# Patient Record
Sex: MALE | Race: BLACK OR AFRICAN AMERICAN | NOT HISPANIC OR LATINO
[De-identification: names, ages, dates, MRNs, and addresses within clinical notes are randomized per-mention and may not be internally consistent; named-entity substitution may affect disease eponyms.]

---

## 2017-02-08 ENCOUNTER — RESULT REVIEW (OUTPATIENT)
Age: 67
End: 2017-02-08

## 2017-02-16 LAB — INR PPP: 2.3

## 2017-03-31 LAB — INR PPP: 2.3

## 2017-05-21 ENCOUNTER — RESULT REVIEW (OUTPATIENT)
Age: 67
End: 2017-05-21

## 2017-05-22 ENCOUNTER — RESULT REVIEW (OUTPATIENT)
Age: 67
End: 2017-05-22

## 2017-05-22 LAB
INR PPP: 2
INR PPP: 2.3

## 2017-06-26 ENCOUNTER — RESULT REVIEW (OUTPATIENT)
Age: 67
End: 2017-06-26

## 2017-06-26 LAB — INR PPP: 2.8

## 2017-07-10 ENCOUNTER — RESULT REVIEW (OUTPATIENT)
Age: 67
End: 2017-07-10

## 2017-07-10 LAB — INR PPP: 2.2

## 2017-07-19 ENCOUNTER — RESULT REVIEW (OUTPATIENT)
Age: 67
End: 2017-07-19

## 2017-07-19 LAB — INR PPP: 2.3

## 2017-07-31 ENCOUNTER — RESULT REVIEW (OUTPATIENT)
Age: 67
End: 2017-07-31

## 2017-07-31 LAB — INR PPP: 3.2

## 2017-08-17 ENCOUNTER — RESULT REVIEW (OUTPATIENT)
Age: 67
End: 2017-08-17

## 2017-08-17 LAB — INR PPP: 3.1

## 2017-08-23 ENCOUNTER — NON-APPOINTMENT (OUTPATIENT)
Age: 67
End: 2017-08-23

## 2017-08-23 ENCOUNTER — RESULT REVIEW (OUTPATIENT)
Age: 67
End: 2017-08-23

## 2017-08-23 ENCOUNTER — APPOINTMENT (OUTPATIENT)
Dept: CARDIOLOGY | Facility: CLINIC | Age: 67
End: 2017-08-23
Payer: MEDICARE

## 2017-08-23 VITALS
BODY MASS INDEX: 23.54 KG/M2 | DIASTOLIC BLOOD PRESSURE: 80 MMHG | HEART RATE: 73 BPM | SYSTOLIC BLOOD PRESSURE: 110 MMHG | OXYGEN SATURATION: 98 % | WEIGHT: 150 LBS | HEIGHT: 67 IN

## 2017-08-23 DIAGNOSIS — R06.09 OTHER FORMS OF DYSPNEA: ICD-10-CM

## 2017-08-23 DIAGNOSIS — R73.03 PREDIABETES.: ICD-10-CM

## 2017-08-23 DIAGNOSIS — R07.89 OTHER CHEST PAIN: ICD-10-CM

## 2017-08-23 LAB — INR PPP: 2.2

## 2017-08-23 PROCEDURE — 99215 OFFICE O/P EST HI 40 MIN: CPT

## 2017-08-23 PROCEDURE — 93000 ELECTROCARDIOGRAM COMPLETE: CPT

## 2017-08-23 RX ORDER — AMOXICILLIN 500 MG/1
500 CAPSULE ORAL
Qty: 12 | Refills: 0 | Status: DISCONTINUED | COMMUNITY
Start: 2017-04-19

## 2017-08-23 RX ORDER — NITROFURANTOIN (MONOHYDRATE/MACROCRYSTALS) 25; 75 MG/1; MG/1
100 CAPSULE ORAL
Qty: 10 | Refills: 0 | Status: DISCONTINUED | COMMUNITY
Start: 2017-08-09

## 2017-08-23 RX ORDER — CEPHALEXIN 500 MG/1
500 CAPSULE ORAL
Qty: 10 | Refills: 0 | Status: DISCONTINUED | COMMUNITY
Start: 2017-07-07

## 2017-08-23 RX ORDER — AMOXICILLIN AND CLAVULANATE POTASSIUM 875; 125 MG/1; MG/1
875-125 TABLET, COATED ORAL
Qty: 14 | Refills: 0 | Status: DISCONTINUED | COMMUNITY
Start: 2017-06-30

## 2017-08-28 ENCOUNTER — APPOINTMENT (OUTPATIENT)
Dept: CARDIOLOGY | Facility: CLINIC | Age: 67
End: 2017-08-28
Payer: MEDICARE

## 2017-08-28 PROCEDURE — 93306 TTE W/DOPPLER COMPLETE: CPT

## 2017-09-20 ENCOUNTER — RX RENEWAL (OUTPATIENT)
Age: 67
End: 2017-09-20

## 2017-09-20 LAB — INR PPP: 2.3

## 2017-10-04 ENCOUNTER — RESULT REVIEW (OUTPATIENT)
Age: 67
End: 2017-10-04

## 2017-10-04 LAB — INR PPP: 2.2

## 2017-10-20 ENCOUNTER — RESULT REVIEW (OUTPATIENT)
Age: 67
End: 2017-10-20

## 2017-10-20 LAB — INR PPP: 2.5

## 2017-11-15 ENCOUNTER — RESULT REVIEW (OUTPATIENT)
Age: 67
End: 2017-11-15

## 2017-11-15 LAB — INR PPP: 2.6

## 2017-12-13 ENCOUNTER — RESULT REVIEW (OUTPATIENT)
Age: 67
End: 2017-12-13

## 2017-12-13 LAB — INR PPP: 2.4

## 2017-12-20 ENCOUNTER — RESULT REVIEW (OUTPATIENT)
Age: 67
End: 2017-12-20

## 2017-12-20 LAB — INR PPP: 2.3

## 2018-02-21 ENCOUNTER — RESULT REVIEW (OUTPATIENT)
Age: 68
End: 2018-02-21

## 2018-02-21 LAB
INR PPP: 2.7
INR PPP: 2.8

## 2018-03-05 ENCOUNTER — RESULT REVIEW (OUTPATIENT)
Age: 68
End: 2018-03-05

## 2018-03-05 LAB
INR PPP: 1.1
INR PPP: 1.1
INR PPP: 1.8
PROTHROMBIN TIME COMMENT: NORMAL

## 2018-03-12 ENCOUNTER — RESULT REVIEW (OUTPATIENT)
Age: 68
End: 2018-03-12

## 2018-03-12 LAB — INR PPP: 1.9

## 2018-04-02 ENCOUNTER — MEDICATION RENEWAL (OUTPATIENT)
Age: 68
End: 2018-04-02

## 2018-04-02 DIAGNOSIS — N52.9 MALE ERECTILE DYSFUNCTION, UNSPECIFIED: ICD-10-CM

## 2018-04-03 ENCOUNTER — OTHER (OUTPATIENT)
Age: 68
End: 2018-04-03

## 2018-04-03 LAB — INR PPP: 2.4

## 2018-05-21 ENCOUNTER — RESULT REVIEW (OUTPATIENT)
Age: 68
End: 2018-05-21

## 2018-05-21 LAB — INR PPP: 1.9

## 2018-05-29 ENCOUNTER — RESULT REVIEW (OUTPATIENT)
Age: 68
End: 2018-05-29

## 2018-05-29 LAB — INR PPP: 2.2

## 2018-06-26 ENCOUNTER — RESULT REVIEW (OUTPATIENT)
Age: 68
End: 2018-06-26

## 2018-06-26 LAB — INR PPP: 3.3

## 2018-08-29 ENCOUNTER — RESULT REVIEW (OUTPATIENT)
Age: 68
End: 2018-08-29

## 2018-08-29 LAB
INR PPP: 1.7
INR PPP: 1.7
INR PPP: 2.4
INR PPP: 3

## 2018-09-18 ENCOUNTER — RESULT REVIEW (OUTPATIENT)
Age: 68
End: 2018-09-18

## 2018-09-18 LAB — INR PPP: 2.7

## 2018-09-26 ENCOUNTER — APPOINTMENT (OUTPATIENT)
Dept: CARDIOLOGY | Facility: CLINIC | Age: 68
End: 2018-09-26
Payer: MEDICARE

## 2018-09-26 ENCOUNTER — NON-APPOINTMENT (OUTPATIENT)
Age: 68
End: 2018-09-26

## 2018-09-26 VITALS
SYSTOLIC BLOOD PRESSURE: 112 MMHG | DIASTOLIC BLOOD PRESSURE: 60 MMHG | OXYGEN SATURATION: 98 % | BODY MASS INDEX: 24.01 KG/M2 | HEART RATE: 67 BPM | WEIGHT: 153 LBS | HEIGHT: 67 IN

## 2018-09-26 DIAGNOSIS — Z51.81 ENCOUNTER FOR THERAPEUTIC DRUG LVL MONITORING: ICD-10-CM

## 2018-09-26 DIAGNOSIS — Z79.01 ENCOUNTER FOR THERAPEUTIC DRUG LVL MONITORING: ICD-10-CM

## 2018-09-26 PROCEDURE — 93000 ELECTROCARDIOGRAM COMPLETE: CPT

## 2018-09-26 PROCEDURE — 99214 OFFICE O/P EST MOD 30 MIN: CPT

## 2018-09-26 RX ORDER — TERAZOSIN 1 MG/1
1 CAPSULE ORAL
Qty: 30 | Refills: 0 | Status: DISCONTINUED | COMMUNITY
Start: 2017-06-12 | End: 2018-09-26

## 2018-09-27 ENCOUNTER — RESULT REVIEW (OUTPATIENT)
Age: 68
End: 2018-09-27

## 2018-09-27 LAB — INR PPP: 2.2

## 2018-10-16 ENCOUNTER — RESULT REVIEW (OUTPATIENT)
Age: 68
End: 2018-10-16

## 2018-10-16 LAB — INR PPP: 2.4

## 2018-11-27 ENCOUNTER — RESULT REVIEW (OUTPATIENT)
Age: 68
End: 2018-11-27

## 2018-11-27 LAB — INR PPP: 3.1

## 2018-12-07 NOTE — DISCUSSION/SUMMARY
[INR Therapeutic] : the patient's INR is therapeutic [Hyperlipidemia] : hyperlipidemia [Hypertension] : hypertension [None] : none [Stable] : stable [Echocardiogram] : an echocardiogram [Patient] : the patient [___ Month(s)] : [unfilled] month(s) [FreeTextEntry1] : Stable from cardiovascular standpoint. Continue current medical regimen. A heart healthy diet and lifestyle was recommended including low-fat, low-cholesterol, low-salt foods with proper weight maintenance and better carbohydrate choices.

## 2018-12-07 NOTE — PHYSICAL EXAM
[General Appearance - Well Developed] : well developed [Normal Appearance] : normal appearance [Well Groomed] : well groomed [General Appearance - Well Nourished] : well nourished [No Deformities] : no deformities [General Appearance - In No Acute Distress] : no acute distress [Normal Conjunctiva] : the conjunctiva exhibited no abnormalities [Eyelids - No Xanthelasma] : the eyelids demonstrated no xanthelasmas [Normal Oral Mucosa] : normal oral mucosa [No Oral Pallor] : no oral pallor [No Oral Cyanosis] : no oral cyanosis [Normal Jugular Venous A Waves Present] : normal jugular venous A waves present [Normal Jugular Venous V Waves Present] : normal jugular venous V waves present [No Jugular Venous Singh A Waves] : no jugular venous singh A waves [Respiration, Rhythm And Depth] : normal respiratory rhythm and effort [Exaggerated Use Of Accessory Muscles For Inspiration] : no accessory muscle use [Auscultation Breath Sounds / Voice Sounds] : lungs were clear to auscultation bilaterally [Abdomen Soft] : soft [Abdomen Tenderness] : non-tender [Abdomen Mass (___ Cm)] : no abdominal mass palpated [Abnormal Walk] : normal gait [Gait - Sufficient For Exercise Testing] : the gait was sufficient for exercise testing [Nail Clubbing] : no clubbing of the fingernails [Cyanosis, Localized] : no localized cyanosis [Petechial Hemorrhages (___cm)] : no petechial hemorrhages [Skin Color & Pigmentation] : normal skin color and pigmentation [] : no rash [No Venous Stasis] : no venous stasis [Skin Lesions] : no skin lesions [No Skin Ulcers] : no skin ulcer [No Xanthoma] : no  xanthoma was observed [Oriented To Time, Place, And Person] : oriented to person, place, and time [Affect] : the affect was normal [Mood] : the mood was normal [No Anxiety] : not feeling anxious [5th Left ICS - MCL] : palpated at the 5th LICS in the midclavicular line [Not Palpable] : not palpable [No Precordial Heave] : no precordial heave was noted [Normal Rate] : normal [Rhythm Regular] : regular [Normal S1] : abnormal S1 [Normal S2] : abnormal S2 [Prosthetic Mitral Valve] : prosthetic mitral valve heard [No Murmur] : no murmurs heard [Right Carotid Bruit] : no bruit heard over the right carotid [Left Carotid Bruit] : no bruit heard over the left carotid [Right Femoral Bruit] : no bruit heard over the right femoral artery [Left Femoral Bruit] : no bruit heard over the left femoral artery

## 2018-12-07 NOTE — HISTORY OF PRESENT ILLNESS
[FreeTextEntry1] : 68-year-old male here for routine checkup. \par Status post [employer] Judes Mitral valve prosthesis in 2002. Patient was scheduled for minimally invasive surgery  but ended up with extensive right chest wall scar for reasons that are unclear. On chronic anticoagulation which has been fairly well controlled. Also history of hypertension and hyperlipidemia. No new focal complaints. No prior history of significant coronary artery disease. Labs from PCP were reviewed and were unremarkable. Continues to followup in our Coumadin clinic.

## 2018-12-27 ENCOUNTER — RESULT REVIEW (OUTPATIENT)
Age: 68
End: 2018-12-27

## 2018-12-27 LAB — INR PPP: 2.4

## 2019-01-15 ENCOUNTER — RESULT REVIEW (OUTPATIENT)
Age: 69
End: 2019-01-15

## 2019-01-15 LAB — INR PPP: 2.4

## 2019-02-21 ENCOUNTER — RESULT REVIEW (OUTPATIENT)
Age: 69
End: 2019-02-21

## 2019-02-21 LAB
INR PPP: 2.3
INR PPP: 2.4

## 2019-03-07 ENCOUNTER — RESULT REVIEW (OUTPATIENT)
Age: 69
End: 2019-03-07

## 2019-03-07 LAB — INR PPP: 2

## 2019-03-19 ENCOUNTER — RESULT REVIEW (OUTPATIENT)
Age: 69
End: 2019-03-19

## 2019-03-19 LAB — INR PPP: 2.5

## 2019-04-01 ENCOUNTER — RESULT REVIEW (OUTPATIENT)
Age: 69
End: 2019-04-01

## 2019-04-01 LAB
INR PPP: 2.1
INR PPP: 4
PROTHROMBIN TIME COMMENT: NORMAL

## 2019-04-06 ENCOUNTER — TRANSCRIPTION ENCOUNTER (OUTPATIENT)
Age: 69
End: 2019-04-06

## 2019-04-16 ENCOUNTER — RESULT REVIEW (OUTPATIENT)
Age: 69
End: 2019-04-16

## 2019-04-16 LAB
INR PPP: 1.7
PROTHROMBIN TIME COMMENT: NORMAL

## 2019-05-10 ENCOUNTER — RESULT REVIEW (OUTPATIENT)
Age: 69
End: 2019-05-10

## 2019-05-10 LAB — INR PPP: 2.1

## 2019-05-24 ENCOUNTER — RESULT REVIEW (OUTPATIENT)
Age: 69
End: 2019-05-24

## 2019-05-24 LAB — INR PPP: 2.7

## 2019-06-24 ENCOUNTER — RESULT REVIEW (OUTPATIENT)
Age: 69
End: 2019-06-24

## 2019-06-24 LAB
INR PPP: 2.4
PROTHROMBIN TIME COMMENT: NORMAL

## 2019-07-30 ENCOUNTER — RESULT REVIEW (OUTPATIENT)
Age: 69
End: 2019-07-30

## 2019-07-30 LAB — INR PPP: 3.3

## 2019-08-26 ENCOUNTER — RESULT REVIEW (OUTPATIENT)
Age: 69
End: 2019-08-26

## 2019-08-26 LAB
INR PPP: 2.5
INR PPP: 2.9

## 2019-09-23 ENCOUNTER — OTHER (OUTPATIENT)
Age: 69
End: 2019-09-23

## 2019-09-26 ENCOUNTER — RESULT REVIEW (OUTPATIENT)
Age: 69
End: 2019-09-26

## 2019-09-26 LAB
INR PPP: 2.4
INR PPP: 2.4

## 2019-10-10 ENCOUNTER — RESULT REVIEW (OUTPATIENT)
Age: 69
End: 2019-10-10

## 2019-10-10 LAB
INR PPP: 1.8
INR PPP: 3.8

## 2019-11-12 ENCOUNTER — RESULT REVIEW (OUTPATIENT)
Age: 69
End: 2019-11-12

## 2019-11-12 LAB — INR PPP: 2.4

## 2019-11-19 ENCOUNTER — RESULT REVIEW (OUTPATIENT)
Age: 69
End: 2019-11-19

## 2019-11-19 LAB
INR PPP: 2.3
PROTHROMBIN TIME COMMENT: NORMAL

## 2019-12-09 LAB — INR PPP: 2.7

## 2019-12-12 ENCOUNTER — EMERGENCY (EMERGENCY)
Facility: HOSPITAL | Age: 69
LOS: 1 days | Discharge: HOME | End: 2019-12-12
Attending: EMERGENCY MEDICINE | Admitting: EMERGENCY MEDICINE
Payer: MEDICARE

## 2019-12-12 VITALS
RESPIRATION RATE: 18 BRPM | HEART RATE: 75 BPM | SYSTOLIC BLOOD PRESSURE: 113 MMHG | TEMPERATURE: 98 F | DIASTOLIC BLOOD PRESSURE: 73 MMHG | OXYGEN SATURATION: 100 %

## 2019-12-12 VITALS
OXYGEN SATURATION: 100 % | DIASTOLIC BLOOD PRESSURE: 70 MMHG | RESPIRATION RATE: 18 BRPM | HEART RATE: 71 BPM | SYSTOLIC BLOOD PRESSURE: 106 MMHG

## 2019-12-12 DIAGNOSIS — Z98.89 OTHER SPECIFIED POSTPROCEDURAL STATES: Chronic | ICD-10-CM

## 2019-12-12 DIAGNOSIS — Z95.3 PRESENCE OF XENOGENIC HEART VALVE: Chronic | ICD-10-CM

## 2019-12-12 LAB
ALBUMIN SERPL ELPH-MCNC: 4.1 G/DL — SIGNIFICANT CHANGE UP (ref 3.3–5)
ALP SERPL-CCNC: 61 U/L — SIGNIFICANT CHANGE UP (ref 40–120)
ALT FLD-CCNC: 17 U/L — SIGNIFICANT CHANGE UP (ref 4–41)
ANION GAP SERPL CALC-SCNC: 12 MMO/L — SIGNIFICANT CHANGE UP (ref 7–14)
APTT BLD: 39.8 SEC — HIGH (ref 27.5–36.3)
AST SERPL-CCNC: 21 U/L — SIGNIFICANT CHANGE UP (ref 4–40)
BASOPHILS # BLD AUTO: 0.03 K/UL — SIGNIFICANT CHANGE UP (ref 0–0.2)
BASOPHILS NFR BLD AUTO: 0.5 % — SIGNIFICANT CHANGE UP (ref 0–2)
BILIRUB SERPL-MCNC: 0.6 MG/DL — SIGNIFICANT CHANGE UP (ref 0.2–1.2)
BLD GP AB SCN SERPL QL: NEGATIVE — SIGNIFICANT CHANGE UP
BUN SERPL-MCNC: 31 MG/DL — HIGH (ref 7–23)
CALCIUM SERPL-MCNC: 9.3 MG/DL — SIGNIFICANT CHANGE UP (ref 8.4–10.5)
CHLORIDE SERPL-SCNC: 105 MMOL/L — SIGNIFICANT CHANGE UP (ref 98–107)
CO2 SERPL-SCNC: 22 MMOL/L — SIGNIFICANT CHANGE UP (ref 22–31)
CREAT SERPL-MCNC: 1.07 MG/DL — SIGNIFICANT CHANGE UP (ref 0.5–1.3)
EOSINOPHIL # BLD AUTO: 0.02 K/UL — SIGNIFICANT CHANGE UP (ref 0–0.5)
EOSINOPHIL NFR BLD AUTO: 0.4 % — SIGNIFICANT CHANGE UP (ref 0–6)
GLUCOSE SERPL-MCNC: 113 MG/DL — HIGH (ref 70–99)
HCT VFR BLD CALC: 41.4 % — SIGNIFICANT CHANGE UP (ref 39–50)
HGB BLD-MCNC: 13.8 G/DL — SIGNIFICANT CHANGE UP (ref 13–17)
IMM GRANULOCYTES NFR BLD AUTO: 0.2 % — SIGNIFICANT CHANGE UP (ref 0–1.5)
INR BLD: 2.91 — HIGH (ref 0.88–1.17)
LYMPHOCYTES # BLD AUTO: 1.05 K/UL — SIGNIFICANT CHANGE UP (ref 1–3.3)
LYMPHOCYTES # BLD AUTO: 18.9 % — SIGNIFICANT CHANGE UP (ref 13–44)
MCHC RBC-ENTMCNC: 29.9 PG — SIGNIFICANT CHANGE UP (ref 27–34)
MCHC RBC-ENTMCNC: 33.3 % — SIGNIFICANT CHANGE UP (ref 32–36)
MCV RBC AUTO: 89.8 FL — SIGNIFICANT CHANGE UP (ref 80–100)
MONOCYTES # BLD AUTO: 0.44 K/UL — SIGNIFICANT CHANGE UP (ref 0–0.9)
MONOCYTES NFR BLD AUTO: 7.9 % — SIGNIFICANT CHANGE UP (ref 2–14)
NEUTROPHILS # BLD AUTO: 4.02 K/UL — SIGNIFICANT CHANGE UP (ref 1.8–7.4)
NEUTROPHILS NFR BLD AUTO: 72.1 % — SIGNIFICANT CHANGE UP (ref 43–77)
NRBC # FLD: 0 K/UL — SIGNIFICANT CHANGE UP (ref 0–0)
PLATELET # BLD AUTO: 234 K/UL — SIGNIFICANT CHANGE UP (ref 150–400)
PMV BLD: 9.4 FL — SIGNIFICANT CHANGE UP (ref 7–13)
POTASSIUM SERPL-MCNC: 4.2 MMOL/L — SIGNIFICANT CHANGE UP (ref 3.5–5.3)
POTASSIUM SERPL-SCNC: 4.2 MMOL/L — SIGNIFICANT CHANGE UP (ref 3.5–5.3)
PROT SERPL-MCNC: 7.6 G/DL — SIGNIFICANT CHANGE UP (ref 6–8.3)
PROTHROM AB SERPL-ACNC: 33.4 SEC — HIGH (ref 9.8–13.1)
RBC # BLD: 4.61 M/UL — SIGNIFICANT CHANGE UP (ref 4.2–5.8)
RBC # FLD: 14 % — SIGNIFICANT CHANGE UP (ref 10.3–14.5)
RH IG SCN BLD-IMP: POSITIVE — SIGNIFICANT CHANGE UP
SODIUM SERPL-SCNC: 139 MMOL/L — SIGNIFICANT CHANGE UP (ref 135–145)
WBC # BLD: 5.57 K/UL — SIGNIFICANT CHANGE UP (ref 3.8–10.5)
WBC # FLD AUTO: 5.57 K/UL — SIGNIFICANT CHANGE UP (ref 3.8–10.5)

## 2019-12-12 PROCEDURE — 99283 EMERGENCY DEPT VISIT LOW MDM: CPT | Mod: GC

## 2019-12-12 NOTE — ED PROVIDER NOTE - OBJECTIVE STATEMENT
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 pt reports episode two days ago, resolved. today sx began spontaneously and persisted. had b/l rhino rockets placed at [facility]. pt denies trauma, pain, n/v, cp, sob, GI bleeding.

## 2019-12-12 NOTE — ED PROVIDER NOTE - CLINICAL SUMMARY MEDICAL DECISION MAKING FREE TEXT BOX
68 yo m on coumadin transfer from [facility] due to persistent epistaxis for ENT eval. pt well appearing, vss, speaking full sentences. will check coags, labs. ent called. reassess.

## 2019-12-12 NOTE — CONSULT NOTE ADULT - SUBJECTIVE AND OBJECTIVE BOX
HPI: 69M with hx valve replacement (on coumadin) transferred from [facility] for evaluation of epistaxis s/p bilateral nasal packing placement by outside ED. Patient reports episode began this morning from right nare and continued to bleed on/off throughout the day. Since packing placed in ED at OSH, he denies any further bleeding. No prior hx nose bleeds, nasal trauma, nasal surgeries    PAST MEDICAL & SURGICAL HISTORY:  CAD (coronary artery disease)  CVA (cerebral vascular accident): 2008  Hyperlipidemia  HTN (hypertension)  Colon polyp  Mitral valve replaced  H/O colonoscopy  S/P eye surgery: bilateral cataracts  S/P mitral valve replacement with bioprosthetic valve: 2003    Allergies: NKDA    Exam  Vital Signs Last 24 Hrs  T(C): 36.7 (12 Dec 2019 18:44), Max: 36.7 (12 Dec 2019 18:44)  T(F): 98.1 (12 Dec 2019 18:44), Max: 98.1 (12 Dec 2019 18:44)  HR: 71 (12 Dec 2019 20:00) (71 - 75)  BP: 106/70 (12 Dec 2019 20:00) (106/70 - 121/72)  BP(mean): --  RR: 18 (12 Dec 2019 20:00) (18 - 18)  SpO2: 100% (12 Dec 2019 20:00) (100% - 100%)  NAD, awake  Breathing comfortably on room air, no stridor, no stertor  Nose: bilateral nasal packing in place, no active bleeding  OC/OP: tongue wnl, FOM soft/flat, OP clear  Neck soft, flat    Bilateral nasal packing removed. No bleeding noted. Tiny amount of surgicel coated with afrin placed in right nasal cavity to prevent recurrence of epistaxis.    A/P 69M with hx valve replacement (on coumadin) with R epistaxis, now resolved. Surgicel placed in right nare  - no further ORL intervention  - surgicel is absorbable and does not need to be removed  - avoid digital trauma, forceful nose blowing, sneeze with mouth open  - if epistaxis recurs, spray afrin (may use up to BID x 3 days max) and apply firm nasal pressure for 15 min without letting go  - nasal hydration - nasal saline 2-3 sprays 2-3 times daily, vasoline or nasal saline gel qnightly  - if issues, may follow up with ORL outpatient. [phone]  - monitor for 2-3 hours, if no further bleeding, okay to DC from ORL perspective  - d/w attending

## 2019-12-12 NOTE — ED PROVIDER NOTE - PHYSICAL EXAMINATION
General: well appearing, interactive, well nourished, no apparent distress, ncat, speaking full sentences  HEENT: EOMI, PERRLA, b/l rhino rockets in place soaked in blood, dried blood noted in posterior pharynx  Neck: supple, no lymphadenopathy, full range of motion, no nuchal rigidity  CV: RRR  Resp: non labored breathing  Abd: non-distended, soft, non-tender  : no CVA tenderness  MSK: full range of motion, no cyanosis, no edema, no clubbing, no immobility  Neuro: no focal deficits   Skin: no rashes, skin intact

## 2019-12-12 NOTE — ED PROVIDER NOTE - NSFOLLOWUPINSTRUCTIONS_ED_ALL_ED_FT
1) Please follow up with your Primary Care Provider in 24-48 hours  2) Seek immediate medical care for any new or returning symptoms including but not limited persistent bleeding, difficulty breathing  3) Use Afrin in each nostril up to three times a day  4) Use Saline spray in each nostril twice a day

## 2019-12-12 NOTE — ED PROVIDER NOTE - NS ED ROS FT
GENERAL: No fever or chills, //             EYES: no change in vision, //             HEENT: epistaxis, //             CARDIAC: no chest pain, //              PULMONARY: no cough or SOB, //             GI: no abdominal pain, no nausea or no vomiting, no diarrhea or constipation, //             : No changes in urination,  //            SKIN: no rashes,  //            NEURO: no headache,  //             MSK: No joint pain otherwise as HPI or negative. ~Don Raymundo DO PGY2

## 2019-12-12 NOTE — ED PROVIDER NOTE - PROGRESS NOTE DETAILS
eval by ENT, no signs of active bleeding. rockets removed. dissolvable surgicel placed. to observe for 2h  - Don Raymundo D.O. PGY2 Patient reassessed, NAD, non-toxic appearing. results dw pt, questions answered. pt states INR usually is 2.5 return precautions given. states will take coumadin tonight.   - Don Raymundo D.O. PGY2

## 2019-12-12 NOTE — ED ADULT TRIAGE NOTE - CHIEF COMPLAINT QUOTE
Brought in by EMS as a transfer from [facility] for ENT eval for epistaxis. On coumadin for afib. Arrives with rhino rocket in bilateral nostrils.

## 2019-12-12 NOTE — ED PROVIDER NOTE - ATTENDING CONTRIBUTION TO CARE
Pt sent from [facility] for ENT eval for epistaxis on coumadin. Pt is coumadin for valve, has rhinorocket in place from [facility] but oozing from b/l nares. HDS, pending labs and ENT eval.

## 2019-12-12 NOTE — ED PROVIDER NOTE - PMH
CAD (coronary artery disease)    Colon polyp    CVA (cerebral vascular accident)  2008  HTN (hypertension)    Hyperlipidemia    Mitral valve replaced

## 2019-12-12 NOTE — ED ADULT NURSE NOTE - CHIEF COMPLAINT QUOTE
Brought in by EMS as a transfer from [facility] for ENT eval for epistaxis. On coumadin for afib. Arrives with rhino rocket in bilateral nostrils.

## 2019-12-12 NOTE — ED PROVIDER NOTE - PSH
H/O colonoscopy    S/P eye surgery  bilateral cataracts  S/P mitral valve replacement with bioprosthetic valve  2003

## 2019-12-12 NOTE — ED ADULT NURSE NOTE - OBJECTIVE STATEMENT
pt arrives with bleeding  from b/l nostrils since earlier today. pt denies any sob , dizziness or chest pain. Pts vs wnl. awaiting ENT.

## 2019-12-12 NOTE — ED PROVIDER NOTE - PATIENT PORTAL LINK FT
You can access the FollowMyHealth Patient Portal offered by [facility] by registering at the following website: [email or web address]. By joining [facility]’s FollowMyHealth portal, you will also be able to view your health information using other applications (apps) compatible with our system.

## 2019-12-31 ENCOUNTER — RESULT REVIEW (OUTPATIENT)
Age: 69
End: 2019-12-31

## 2019-12-31 LAB — INR PPP: 2.4

## 2020-01-20 LAB — INR PPP: 3.5

## 2020-01-27 LAB — INR PPP: 2

## 2020-02-03 LAB — INR PPP: 3.8

## 2020-02-19 LAB
INR PPP: 3.1
INR PPP: 3.3

## 2020-03-02 LAB — INR PPP: 3.8

## 2020-03-18 ENCOUNTER — APPOINTMENT (OUTPATIENT)
Dept: CARDIOLOGY | Facility: CLINIC | Age: 70
End: 2020-03-18

## 2020-03-20 LAB — INR PPP: 3.1

## 2020-04-17 LAB — INR PPP: 2.7

## 2020-04-21 LAB — INR PPP: 3

## 2020-04-29 LAB — INR PPP: 2.4

## 2020-05-19 LAB — INR PPP: 2.7

## 2020-06-22 LAB — INR PPP: 2.9

## 2020-06-26 LAB — INR PPP: 1.9

## 2020-07-22 LAB — INR PPP: 2.5

## 2020-09-08 LAB
INR PPP: 2.6
INR PPP: 2.8

## 2020-09-17 LAB — INR PPP: 2.3

## 2020-09-23 ENCOUNTER — APPOINTMENT (OUTPATIENT)
Dept: CARDIOLOGY | Facility: CLINIC | Age: 70
End: 2020-09-23
Payer: MEDICARE

## 2020-09-23 ENCOUNTER — NON-APPOINTMENT (OUTPATIENT)
Age: 70
End: 2020-09-23

## 2020-09-23 VITALS
DIASTOLIC BLOOD PRESSURE: 84 MMHG | OXYGEN SATURATION: 98 % | SYSTOLIC BLOOD PRESSURE: 120 MMHG | HEIGHT: 67 IN | BODY MASS INDEX: 23.86 KG/M2 | HEART RATE: 80 BPM | TEMPERATURE: 97.8 F | WEIGHT: 152 LBS

## 2020-09-23 DIAGNOSIS — Z86.79 PERSONAL HISTORY OF OTHER DISEASES OF THE CIRCULATORY SYSTEM: ICD-10-CM

## 2020-09-23 PROCEDURE — 99214 OFFICE O/P EST MOD 30 MIN: CPT

## 2020-09-23 PROCEDURE — 93306 TTE W/DOPPLER COMPLETE: CPT

## 2020-09-23 PROCEDURE — 93000 ELECTROCARDIOGRAM COMPLETE: CPT

## 2020-09-23 NOTE — DISCUSSION/SUMMARY
[INR Therapeutic] : the patient's INR is therapeutic [Hyperlipidemia] : hyperlipidemia [Hypertension] : hypertension [None] : none [Stable] : stable [Echocardiogram] : an echocardiogram [Patient] : the patient [___ Month(s)] : [unfilled] month(s) [FreeTextEntry1] : Stable from cardiovascular standpoint. Continue current medical regimen. \par Echo requested for evaluation of valve function.\par \par A heart healthy diet and lifestyle was recommended including low-fat, low-cholesterol, low-salt foods with proper weight maintenance and better carbohydrate choices.

## 2020-09-23 NOTE — HISTORY OF PRESENT ILLNESS
[FreeTextEntry1] : 70-year-old male here for routine checkup. \par Status post [employer] Judes Mitral valve prosthesis in 2002. Patient was scheduled for minimally invasive surgery  but ended up with extensive right chest wall scar for reasons that are unclear. On chronic anticoagulation which has been fairly well controlled. Also history of hypertension and hyperlipidemia. No new focal complaints. No prior history of significant coronary artery disease. Labs from PCP were reviewed and were unremarkable. Continues to followup in our Coumadin clinic.

## 2020-10-26 LAB
INR PPP: 2.8
INR PPP: 4.2

## 2020-11-09 LAB — INR PPP: 1.9

## 2020-12-01 LAB
INR PPP: 2.3
INR PPP: 3.2

## 2020-12-23 LAB
INR PPP: 2.4
INR PPP: 2.7

## 2021-01-04 LAB — INR PPP: 2.4

## 2021-01-26 LAB — INR PPP: 1.6

## 2021-02-03 LAB — INR PPP: 2.7

## 2021-02-26 LAB — INR PPP: 2

## 2021-03-02 LAB
INR PPP: 2.1
PROTHROMBIN TIME COMMENT: NORMAL

## 2021-03-23 LAB
INR PPP: 2.3
INR PPP: 3.1

## 2021-04-08 LAB — INR PPP: 2

## 2021-05-11 LAB — INR PPP: 2.7

## 2021-05-13 LAB — INR PPP: 2.4

## 2021-05-27 LAB — INR PPP: 2.8

## 2021-06-24 LAB — INR PPP: 2.7

## 2021-07-13 LAB — INR PPP: 2.9

## 2021-08-02 LAB — INR PPP: 2.1

## 2021-08-13 LAB
INR PPP: 2.5
PROTHROMBIN TIME COMMENT: NORMAL

## 2021-08-24 LAB
INR PPP: 2
INR PPP: 2
PROTHROMBIN TIME COMMENT: NORMAL

## 2021-09-16 LAB — INR PPP: 2.8

## 2021-09-28 LAB
INR PPP: 2.6
INR PPP: 2.9

## 2021-10-05 ENCOUNTER — RESULT CHARGE (OUTPATIENT)
Age: 71
End: 2021-10-05

## 2021-10-06 ENCOUNTER — NON-APPOINTMENT (OUTPATIENT)
Age: 71
End: 2021-10-06

## 2021-10-06 ENCOUNTER — APPOINTMENT (OUTPATIENT)
Dept: CARDIOLOGY | Facility: CLINIC | Age: 71
End: 2021-10-06
Payer: MEDICARE

## 2021-10-06 VITALS
WEIGHT: 152 LBS | BODY MASS INDEX: 23.86 KG/M2 | DIASTOLIC BLOOD PRESSURE: 72 MMHG | OXYGEN SATURATION: 94 % | SYSTOLIC BLOOD PRESSURE: 116 MMHG | HEIGHT: 67 IN | HEART RATE: 62 BPM | TEMPERATURE: 98.1 F

## 2021-10-06 DIAGNOSIS — Z95.2 PRESENCE OF PROSTHETIC HEART VALVE: ICD-10-CM

## 2021-10-06 PROCEDURE — 99214 OFFICE O/P EST MOD 30 MIN: CPT

## 2021-10-06 PROCEDURE — 93000 ELECTROCARDIOGRAM COMPLETE: CPT

## 2021-10-06 RX ORDER — NEBIVOLOL HYDROCHLORIDE 10 MG/1
10 TABLET ORAL DAILY
Refills: 0 | Status: ACTIVE | COMMUNITY

## 2021-10-06 RX ORDER — ATORVASTATIN CALCIUM 40 MG/1
40 TABLET, FILM COATED ORAL
Qty: 90 | Refills: 3 | Status: ACTIVE | COMMUNITY
Start: 2021-10-06 | End: 1900-01-01

## 2021-10-06 RX ORDER — TADALAFIL 5 MG/1
5 TABLET, FILM COATED ORAL DAILY
Qty: 30 | Refills: 0 | Status: DISCONTINUED | COMMUNITY
Start: 2018-04-02 | End: 2021-10-06

## 2021-10-06 NOTE — HISTORY OF PRESENT ILLNESS
[FreeTextEntry1] : 71-year-old male followed for h/o [employer] Judes Mitral valve prosthesis in 2002. Patient was scheduled for minimally invasive surgery  but ended up with extensive right chest wall scar for reasons that are unclear. On chronic anticoagulation which had been fairly well controlled, and h/o hypertension and hyperlipidemia.  No known history of significant coronary artery disease. Continues to followup in our Coumadin clinic.\par \par He had serial hospitalizations at [facility] for TIA/CVAs.  Most recent hospitalization was in August, 2021 at which time CT head showed evidence of minor atherosclerotic plaque of the carotid bifurcations without significant narrowing and no evidence of any hemorrhage or infarction.  It is unclear if his medications were changed and at the time he was hospitalized his PT/INR appeared therapeutic.  No documented evidence of dysrhythmia was noted.

## 2021-10-06 NOTE — CARDIOLOGY SUMMARY
[de-identified] : 10/6/2021, normal sinus rhythm. [de-identified] : 3/31/2021, EF equals 55 to 60% with evidence of diastolic dysfunction, dilated right ventricle.  Trace mitral insufficiency, trace tricuspid insufficiency. [___] : [unfilled]

## 2021-10-06 NOTE — DISCUSSION/SUMMARY
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  No evidence of significant dysrhythmias seen and no thrombus was noted on transthoracic echo done in March.  Is there any benefit to keeping his INR at a higher range from 3.0-3.5?  Patient had been discharged on atorvastatin which is a high potency statin and certainly preferred over simvastatin.  Blood pressures appear adequately controlled on current regimen (although patient cannot recall an additional antihypertensive that he is on).  Patient referred for follow-up at neurology.  No benefit from ILR implantation or MAREN as patient will need long-term anticoagulation irrespective.

## 2021-10-21 LAB — INR PPP: 2.1

## 2021-11-02 LAB
INR PPP: 2.8
INR PPP: 4.1

## 2021-11-15 LAB
INR PPP: 2.4
INR PPP: 2.9

## 2021-12-01 LAB — INR PPP: 2.6

## 2022-01-31 LAB
INR PPP: 2.3
INR PPP: 2.5
INR PPP: 2.5
INR PPP: 2.6
PROTHROMBIN TIME COMMENT: NORMAL

## 2022-02-14 LAB
INR PPP: 2.4
INR PPP: 3

## 2022-03-16 LAB — INR PPP: 2.6

## 2022-03-22 LAB — INR PPP: 2.6

## 2022-04-06 ENCOUNTER — APPOINTMENT (OUTPATIENT)
Dept: CARDIOLOGY | Facility: CLINIC | Age: 72
End: 2022-04-06
Payer: MEDICARE

## 2022-04-06 ENCOUNTER — NON-APPOINTMENT (OUTPATIENT)
Age: 72
End: 2022-04-06

## 2022-04-06 VITALS
OXYGEN SATURATION: 98 % | HEIGHT: 67 IN | HEART RATE: 75 BPM | SYSTOLIC BLOOD PRESSURE: 126 MMHG | WEIGHT: 151 LBS | BODY MASS INDEX: 23.7 KG/M2 | DIASTOLIC BLOOD PRESSURE: 60 MMHG | TEMPERATURE: 97.9 F

## 2022-04-06 DIAGNOSIS — Z79.01 LONG TERM (CURRENT) USE OF ANTICOAGULANTS: ICD-10-CM

## 2022-04-06 DIAGNOSIS — I10 ESSENTIAL (PRIMARY) HYPERTENSION: ICD-10-CM

## 2022-04-06 DIAGNOSIS — G45.9 TRANSIENT CEREBRAL ISCHEMIC ATTACK, UNSPECIFIED: ICD-10-CM

## 2022-04-06 DIAGNOSIS — E78.5 HYPERLIPIDEMIA, UNSPECIFIED: ICD-10-CM

## 2022-04-06 PROCEDURE — 93000 ELECTROCARDIOGRAM COMPLETE: CPT

## 2022-04-06 PROCEDURE — 99213 OFFICE O/P EST LOW 20 MIN: CPT

## 2022-04-06 PROCEDURE — 93306 TTE W/DOPPLER COMPLETE: CPT

## 2022-04-06 NOTE — HISTORY OF PRESENT ILLNESS
[FreeTextEntry1] : 72 year-old male followed for h/o [employer] Judes Mitral valve prosthesis in 2002. Patient was scheduled for minimally invasive surgery  but ended up with extensive right chest wall scar for reasons that are unclear. On chronic anticoagulation which had been fairly well controlled, and h/o hypertension and hyperlipidemia.  No known history of significant coronary artery disease. Continues to followup in our Coumadin clinic.\par \par He had serial hospitalizations at [facility] for TIA/CVAs.  Most recent hospitalization was in August, 2021 at which time CT head showed evidence of minor atherosclerotic plaque of the carotid bifurcations without significant narrowing and no evidence of any hemorrhage or infarction.  It is unclear if his medications were changed and at the time he was hospitalized his PT/INR appeared therapeutic.  No documented evidence of dysrhythmia was noted.\par \par Feeling well, no complaints.

## 2022-04-06 NOTE — CARDIOLOGY SUMMARY
[___] : [unfilled] [de-identified] : 4/6/22, Sinus  Rhythm , -Poor R-wave progression -nonspecific -consider old anterior infarct. \par 10/6/2021, normal sinus rhythm. [de-identified] : 3/31/2021, EF equals 55 to 60% with evidence of diastolic dysfunction, dilated right ventricle.  Trace mitral insufficiency, trace tricuspid insufficiency.

## 2022-04-06 NOTE — DISCUSSION/SUMMARY
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  No evidence of significant dysrhythmias seen and no thrombus was noted on transthoracic echo done in March. Blood pressures appear adequately controlled on current regimen.  Repeat echo to assess LVEF/prosthetic valve.

## 2022-04-07 PROBLEM — G45.9 TIA (TRANSIENT ISCHEMIC ATTACK): Status: ACTIVE | Noted: 2021-10-06

## 2022-04-07 PROBLEM — I10 ESSENTIAL HYPERTENSION: Status: ACTIVE | Noted: 2020-09-23

## 2022-04-20 ENCOUNTER — NON-APPOINTMENT (OUTPATIENT)
Age: 72
End: 2022-04-20

## 2022-05-24 LAB
INR PPP: 2.4
INR PPP: 2.7

## 2022-09-21 LAB — INR PPP: 2.7

## 2022-10-18 LAB — INR PPP: 3.6

## 2022-10-26 LAB — INR PPP: 2.1

## 2022-11-09 LAB — INR PPP: 2.5

## 2022-11-15 LAB — INR PPP: 3.3

## 2022-11-23 LAB — INR PPP: 2.4

## 2022-12-14 LAB
INR PPP: 1.2
PROTHROMBIN TIME COMMENT: NORMAL

## 2023-01-24 LAB
INR PPP: 1.8
INR PPP: 2.5
INR PPP: 3.1
PROTHROMBIN TIME COMMENT: NORMAL

## 2024-10-10 NOTE — ED ADULT NURSE NOTE - NS ED NOTE ABUSE SUSPICION NEGLECT YN
No Render Risk Assessment In Note?: no Detail Level: Simple Comment: Patient was seen and also treated by [facility]